# Patient Record
Sex: MALE | Race: BLACK OR AFRICAN AMERICAN | NOT HISPANIC OR LATINO | URBAN - METROPOLITAN AREA
[De-identification: names, ages, dates, MRNs, and addresses within clinical notes are randomized per-mention and may not be internally consistent; named-entity substitution may affect disease eponyms.]

---

## 2023-12-04 ENCOUNTER — EMERGENCY (EMERGENCY)
Facility: HOSPITAL | Age: 33
LOS: 0 days | Discharge: HOME | End: 2023-12-04
Attending: EMERGENCY MEDICINE
Payer: MEDICAID

## 2023-12-04 VITALS
WEIGHT: 158.73 LBS | SYSTOLIC BLOOD PRESSURE: 105 MMHG | DIASTOLIC BLOOD PRESSURE: 70 MMHG | RESPIRATION RATE: 14 BRPM | OXYGEN SATURATION: 98 % | TEMPERATURE: 98 F | HEART RATE: 72 BPM | HEIGHT: 68 IN

## 2023-12-04 DIAGNOSIS — R21 RASH AND OTHER NONSPECIFIC SKIN ERUPTION: ICD-10-CM

## 2023-12-04 DIAGNOSIS — R23.8 OTHER SKIN CHANGES: ICD-10-CM

## 2023-12-04 DIAGNOSIS — L29.8 OTHER PRURITUS: ICD-10-CM

## 2023-12-04 PROCEDURE — 71046 X-RAY EXAM CHEST 2 VIEWS: CPT | Mod: 26

## 2023-12-04 PROCEDURE — 99284 EMERGENCY DEPT VISIT MOD MDM: CPT

## 2023-12-04 NOTE — ED ADULT NURSE NOTE - NSFALLUNIVINTERV_ED_ALL_ED
Bed/Stretcher in lowest position, wheels locked, appropriate side rails in place/Call bell, personal items and telephone in reach/Instruct patient to call for assistance before getting out of bed/chair/stretcher/Non-slip footwear applied when patient is off stretcher/[place] to call system/Physically safe environment - no spills, clutter or unnecessary equipment/Purposeful proactive rounding/Room/bathroom lighting operational, light cord in reach Bed/Stretcher in lowest position, wheels locked, appropriate side rails in place/Call bell, personal items and telephone in reach/Instruct patient to call for assistance before getting out of bed/chair/stretcher/Non-slip footwear applied when patient is off stretcher/[place] to call system/Physically safe environment - no spills, clutter or unnecessary equipment/Purposeful proactive rounding/Room/bathroom lighting operational, light cord in reach

## 2023-12-04 NOTE — ED PROVIDER NOTE - NSFOLLOWUPINSTRUCTIONS_ED_ALL_ED_FT
Please call the immigration office for specific clinic perform Tuberculosis test or follow up with Dr. [facility]. return if fever, chills, weight loss, night sweat or coughs.

## 2023-12-04 NOTE — ED ADULT NURSE NOTE - CHIEF COMPLAINT QUOTE
came here for tuberculosis test for [facility] program and to send result to [facility]. patient is also complaining of bumps to left lower arm and right thigh with mild pain, denies itchiness came here for tuberculosis test for [facility] program and to send result to [facility] [place]. patient is also complaining of bumps to left lower arm and right thigh with mild pain, denies itchiness

## 2023-12-04 NOTE — ED PROVIDER NOTE - NS ED MD DISPO DISCHARGE
1. Do you have an Epinephrine auto injector with you? Yes    2. What antihistamine did you take today (Examples: Zyrtec/Cetirizine, Claritin, Xyzal, Benadryl, Allegra)? Xyzal    3. If you have asthma: Have you had any shortness of breath, wheezing or cough within the last 48 hours? No    4. Since your last injection, have you been seen in the Emergency Department or Urgent Care Clinic for your breathing? No    5. Are you taking any Beta Blocker Medications (examples: Metoprolol, Atenolol)?  No    6. Women of Childbearing Age: Since your last allergy injection, have you become pregnant or do you think that you are pregnant?  No    7. Have you been prescribed any antibiotics in the last 5 days? No    8. Have you had a reaction to your last allergy injection? If yes, what was your reaction? No   > Any symptoms other than at the site of injection (Generalized itching, breathing difficulty, redness, hives, swelling, etc.) No    
Home

## 2023-12-04 NOTE — ED PROVIDER NOTE - CARE PROVIDERS DIRECT ADDRESSES
[email or web address]

## 2023-12-04 NOTE — ED PROVIDER NOTE - PROVIDER TOKENS
PROVIDER:[TOKEN:[[id number]],FOLLOWUP:[1-3 Days]] PROVIDER:[TOKEN:[[id number]],FOLLOWUP:[1-3 Days]]

## 2023-12-04 NOTE — ED PROVIDER NOTE - PHYSICAL EXAMINATION
Skin- + few scattered macular rash to the bilateral fore arms and darkened macular rash to the right lower leg non tender no discharge nontender to palp

## 2023-12-04 NOTE — ED PROVIDER NOTE - OBJECTIVE STATEMENT
33 years old male walked in request for TB test for the immigration office. Pt denies hx of exposed to TB, cough,  weight loss, night sweat, fever, or chills. Pt also c/o red bump to the bilateral fore arms and right thigh with slight itching 2 weeks.

## 2023-12-04 NOTE — ED PROVIDER NOTE - CLINICAL SUMMARY MEDICAL DECISION MAKING FREE TEXT BOX
Pt has no recent hx of exposure to TB, cough, weight loss, night sweats and fever/chills. the TB test is not available in the emergency room Pt is advised to call the immigration office for the associated clinic or follow up with Dr. [facility]. Pt also c/o slight itching red bumps two on ight fore arm and one on left fore arm 4 days ago and dark bumps to the right tight 2 weeks ago. th cause of bump most likely due to bug bites. CXR is ordered. Pt has no recent hx of exposure to TB, cough, weight loss, night sweats and fever/chills. the TB test is not available in the emergency room Pt is advised to call the immigration office for the associated clinic or follow up with Dr[facility]. Pt also c/o slight itching red bumps two on ight fore arm and one on left fore arm 4 days ago and dark bumps to the right tight 2 weeks ago. th cause of bump most likely due to bug bites. CXR is ordered.  cxr is ready by me.

## 2023-12-04 NOTE — ED PROVIDER NOTE - CARE PROVIDER_API CALL
Nain Weaver  Internal Medicine  [address] [place] [address]  Phone: [phone]  Fax: [phone]  Follow Up Time: 1-3 Days   Nain Weaver  Internal Medicine  [address] [place] [address]  Phone: [phone]  Fax: [phone]  Follow Up Time: 1-3 Days

## 2023-12-04 NOTE — ED PROVIDER NOTE - PATIENT PORTAL LINK FT
You can access the FollowMyHealth Patient Portal offered by [facility] by registering at the following website: [email or web address]. By joining [facility]s FollowMyHealth portal, you will also be able to view your health information using other applications (apps) compatible with our system. You can access the FollowMyHealth Patient Portal offered by [facility] by registering at the following website: [email or web address]. By joining [facility]’s FollowMyHealth portal, you will also be able to view your health information using other applications (apps) compatible with our system.

## 2023-12-04 NOTE — ED ADULT TRIAGE NOTE - CHIEF COMPLAINT QUOTE
came here for tuberculosis test for [facility] program and to send result to [facility]. patient is also complaining of bumps to left lower arm and right thigh with mild pain, denies itchiness came here for tuberculosis test for [facility] program and to send result to [facility]. patient is also complaining of bumps to left lower arm and right thigh with mild pain, denies itchiness